# Patient Record
Sex: FEMALE | Race: WHITE | NOT HISPANIC OR LATINO | Employment: FULL TIME | URBAN - METROPOLITAN AREA
[De-identification: names, ages, dates, MRNs, and addresses within clinical notes are randomized per-mention and may not be internally consistent; named-entity substitution may affect disease eponyms.]

---

## 2017-03-15 PROBLEM — J30.9 ALLERGIC RHINITIS: Status: ACTIVE | Noted: 2017-03-15

## 2017-03-15 PROBLEM — E88.819 INSULIN RESISTANCE: Status: ACTIVE | Noted: 2017-03-15

## 2017-03-15 PROBLEM — E28.2 PCOS (POLYCYSTIC OVARIAN SYNDROME): Status: ACTIVE | Noted: 2017-03-15

## 2020-03-06 ENCOUNTER — OFFICE VISIT (OUTPATIENT)
Dept: FAMILY MEDICINE | Facility: CLINIC | Age: 30
End: 2020-03-06
Payer: COMMERCIAL

## 2020-03-06 VITALS
HEART RATE: 85 BPM | WEIGHT: 169.75 LBS | SYSTOLIC BLOOD PRESSURE: 122 MMHG | OXYGEN SATURATION: 99 % | DIASTOLIC BLOOD PRESSURE: 88 MMHG | BODY MASS INDEX: 30.08 KG/M2 | TEMPERATURE: 98 F | HEIGHT: 63 IN

## 2020-03-06 DIAGNOSIS — H57.89 IRRITATION OF RIGHT EYE: Primary | ICD-10-CM

## 2020-03-06 PROCEDURE — 99202 OFFICE O/P NEW SF 15 MIN: CPT | Mod: S$GLB,,, | Performed by: NURSE PRACTITIONER

## 2020-03-06 PROCEDURE — 3008F BODY MASS INDEX DOCD: CPT | Mod: CPTII,S$GLB,, | Performed by: NURSE PRACTITIONER

## 2020-03-06 PROCEDURE — 99202 PR OFFICE/OUTPT VISIT, NEW, LEVL II, 15-29 MIN: ICD-10-PCS | Mod: S$GLB,,, | Performed by: NURSE PRACTITIONER

## 2020-03-06 PROCEDURE — 3008F PR BODY MASS INDEX (BMI) DOCUMENTED: ICD-10-PCS | Mod: CPTII,S$GLB,, | Performed by: NURSE PRACTITIONER

## 2020-03-06 RX ORDER — ERGOCALCIFEROL 1.25 MG/1
50000 CAPSULE ORAL WEEKLY
COMMUNITY
Start: 2020-02-22

## 2020-03-06 RX ORDER — BUSPIRONE HYDROCHLORIDE 5 MG/1
5 TABLET ORAL 2 TIMES DAILY
COMMUNITY
Start: 2020-02-22

## 2020-03-06 RX ORDER — NORETHINDRONE ACETATE AND ETHINYL ESTRADIOL AND FERROUS FUMARATE 1MG-20(24)
KIT ORAL
COMMUNITY
Start: 2020-01-08

## 2020-03-06 RX ORDER — BUPROPION HYDROCHLORIDE 150 MG/1
TABLET ORAL
COMMUNITY
Start: 2020-02-03

## 2020-03-06 NOTE — Clinical Note
Chon Mcnair MD,  I saw your patient today in [facility]. If you have any questions, please do not hesitate to contact me.  Thank you!  TEODORO Blunt, [facility]

## 2020-03-06 NOTE — PATIENT INSTRUCTIONS
I suspect you may have a superficial abrasion on the cornea.  Rest your eyes all evening and night--eyes closed, saline drops.  If not better tomorrow, ask your eye doctor to take a look.    If you have concerns or questions, please do not hesitate to call.  If you have any questions, please do not hesitate to call.  You can reach us at [phone] Monday through Friday 7 a.m. to 6:30 p.m., Saturday 9 a.m. to 4:30 p.m. and Sunday 9 a.m to 2:30 p.m.  Or call Dr. Mcnair    Thank you for using the [facility]!    DEBORAH Blunt, CNP, FNP-BC [facility]    To rate your experience with TEODORO Blunt, click on the link below:      [email or web address]

## 2020-03-06 NOTE — PROGRESS NOTES
Jagdish Alvarado is a 29 y.o. female patient of Chon Mcnair MD who presents to the clinic today for   Chief Complaint   Patient presents with    Eye Pain     right eye for 3 days; itches; watery   .    HPI    Pt, who is not known to me, reports a new problem to me:  Changed contacts then took them out.  Still getting worse.  Not like pink eye.  Mildy watery in the morning, no crusting.   On the way to town the pain worsened this afternoon.  Tried antihistamine drops.  Has very bad allergies.  Pine pollen is really bad.    Last eye exam >1 yr ago.  Has next appt set up for a couple of weeks from now.  No photophobia.  Feels like something in the eye.   No memory of FB getting into the eye.  Has been windy here and she was outside 2 days ago.  No change in vision.    These symptoms began 3 days  ago and status is worse..     Pt denies the following symptoms:  sudden change in vision, pain in the eye, eye injury or foreign body in the eye.    Aggravating factors include being outside .    Relieving factors include antihistamine drops some improvement, homeopathic drops .    OTC Medications tried are see above.    Prescription medications taken for symptoms are none.    Pertinent medical history: + wears contact, + contact with another who has conjunctivitis, No glaucoma--using drops.  Allergies, pine causes severe allergies.    ROS    Constitutional:  No fever, ? feeling ill--has felt tired.    Head:   No headache  Ears:   No pain, difficulty hearing.  Eyes:  Small amount of watery discharge, not tearing, no change in vision, no lesions, no photophobia.  Nose:   No sinus pain, congestion.  No green runny nose.  Throat:  No ST pain, difficulty swallowing.    Heart/lungs:  No new sxs    GI/:  No new sxs    MS:  No new bone, joint or muscle problems.    Skin:  No rashes, itching    Past Medical History:   Diagnosis Date    Abnormal Pap smear of cervix     Insulin resistance     PCOS (polycystic ovarian  "syndrome)     Seasonal allergies        Current Outpatient Medications:     buPROPion (WELLBUTRIN XL) 150 MG TB24 tablet, TAKE ONE TABLET BY MOUTH EVERY 24 HOURS, Disp: , Rfl:     busPIRone (BUSPAR) 5 MG Tab, Take 5 mg by mouth 2 (two) times daily. 1.5 in the morning & 1 in the afternoon, Disp: , Rfl:     cetirizine (ZYRTEC) 10 MG tablet, Take 10 mg by mouth once daily., Disp: , Rfl:     JUNEL FE 24 1 mg-20 mcg (24)/75 mg (4) per tablet, TAKE ONE TABLET BY MOUTH at the same time EVERY DAY AS DIRECTED, Disp: , Rfl:     metformin (GLUCOPHAGE-XR) 500 MG 24 hr tablet, Take 1 tablet (500 mg total) by mouth 3 (three) times daily. (Patient taking differently: Take 1,500 mg by mouth every evening. ), Disp: 90 tablet, Rfl: 3    ergocalciferol (ERGOCALCIFEROL) 50,000 unit Cap, Take 50,000 Units by mouth once a week., Disp: , Rfl:     ibuprofen (ADVIL,MOTRIN) 800 MG tablet, Take 1 tablet (800 mg total) by mouth every 8 (eight) hours. (Patient not taking: Reported on 3/6/2020), Disp: 30 tablet, Rfl: 0    mupirocin (BACTROBAN) 2 % ointment, Apply to affected area 3 times daily (Patient not taking: Reported on 3/6/2020), Disp: 22 g, Rfl: 0    oxyCODONE-acetaminophen (PERCOCET) 5-325 mg per tablet, Take 1 tablet by mouth every 4 (four) hours as needed. (Patient not taking: Reported on 3/6/2020), Disp: 20 tablet, Rfl: 0    ranitidine (ZANTAC) 75 MG tablet, Take 75 mg by mouth 2 (two) times daily. , Disp: , Rfl:      Pt is not a smoker.    PHYSICAL EXAM    Alert, coop 29 y.o. female patient in no acute distress, is not ill-appearing.    Vitals:    03/06/20 1606   BP: 122/88   Pulse: 85   Temp: 98.4 °F (36.9 °C)   TempSrc: Oral   SpO2: 99%   Weight: 77 kg (169 lb 12.1 oz)   Height: 5' 3" (1.6 m)     VS reviewed.  VS stable.  CC, nursing note, medications & PMH all reviewed today.    Head:  Normocephalic, atraumatic.    Eyes:  Lids:  No edema.  No erythema.  No lesion(s) noted.  Lids are symmetrical bilat.             " Conjunctiva not injected, no discharge present             PERRLA bilat.  + red reflex bilat.  No photophobia.             EOMs intact bilat.             Fluorosceine dye instilled into affected eye(s).  No uptake noted.    ENT:  Ext ears normal in appearance.             Nose with no discharge.             Resp:  Respirations even, unlabored.     Heart:  Regular rate.  BP in the normal range.    MS:  Ambulates independently, steady gait.    NEURO:  Alert and oriented x 4.  Responds appropriately during interaction.    Skin:  Warm, dry, color good.     Psych:  Responds appropriately throughout the visit.               Relaxed.  Well-groomed.    Irritation of right eye  Comments:  suspect superficial abrasion          Pt today presents with report of feeling of irritation in the right eye for 3 days.  Recalls no FB but has been outside.  Wears contacts.  First she changed them then she stopped wearing them but the irritation is worse.  No change in vision, no photophobia.  Does have allergies but there's no watery eyes, sneezing or other allergy sxs associated with this.  All other ROS neg.    Exam reveals a normal-appearing eye, including the conjunctiva and lids, eyes symmetrical in appearance.  Fluorosceine strip applied to the eye to stain it.  No uptake noted.    This is a new problem to me.  No work up is planned.        Advised to lie in a dark room with eyes closed until tomorrow.  If not better by then, have an her eye doctor check her eyes.  Pt advised to perform comfort measures recommended on patient instruction sheet .    If not better in 1 day, the patient is advised to call her eye doctor.  If worse or concerns, the patient is advised to call here, the PCP office or [facility] or go to the URGENT CARE or ER.  Explained exam findings, diagnosis and treatment plan to patient.  Questions answered and patient states understanding.